# Patient Record
Sex: FEMALE | Race: ASIAN | NOT HISPANIC OR LATINO | URBAN - METROPOLITAN AREA
[De-identification: names, ages, dates, MRNs, and addresses within clinical notes are randomized per-mention and may not be internally consistent; named-entity substitution may affect disease eponyms.]

---

## 2019-02-22 ENCOUNTER — APPOINTMENT (OUTPATIENT)
Dept: GENERAL RADIOLOGY | Age: 49
End: 2019-02-22

## 2019-02-22 ENCOUNTER — OFFICE VISIT (OUTPATIENT)
Dept: ORTHOPEDICS | Age: 49
End: 2019-02-22

## 2019-02-22 DIAGNOSIS — M25.561 RIGHT KNEE PAIN, UNSPECIFIED CHRONICITY: ICD-10-CM

## 2019-02-22 DIAGNOSIS — M17.11 OSTEOARTHRITIS OF RIGHT PATELLOFEMORAL JOINT: ICD-10-CM

## 2019-02-22 DIAGNOSIS — S83.241A OTHER TEAR OF MEDIAL MENISCUS OF RIGHT KNEE AS CURRENT INJURY, INITIAL ENCOUNTER: Primary | ICD-10-CM

## 2019-02-22 PROCEDURE — 99243 OFF/OP CNSLTJ NEW/EST LOW 30: CPT | Performed by: ORTHOPAEDIC SURGERY

## 2019-02-22 ASSESSMENT — ENCOUNTER SYMPTOMS
DIARRHEA: 0
NAUSEA: 0
SHORTNESS OF BREATH: 0
NUMBNESS: 0
SORE THROAT: 0
CONFUSION: 0
FEVER: 0
STRIDOR: 0
AGITATION: 0
COLOR CHANGE: 0
VOMITING: 0
CHILLS: 0
EYE REDNESS: 0
WHEEZING: 0
BRUISES/BLEEDS EASILY: 0
COUGH: 0
WEAKNESS: 0

## 2019-07-01 ENCOUNTER — OFFICE VISIT (OUTPATIENT)
Dept: ORTHOPEDICS | Age: 49
End: 2019-07-01

## 2019-07-01 DIAGNOSIS — M17.11 OSTEOARTHRITIS OF RIGHT PATELLOFEMORAL JOINT: ICD-10-CM

## 2019-07-01 DIAGNOSIS — S83.241A OTHER TEAR OF MEDIAL MENISCUS OF RIGHT KNEE AS CURRENT INJURY, INITIAL ENCOUNTER: Primary | ICD-10-CM

## 2019-07-01 DIAGNOSIS — M17.12 OSTEOARTHRITIS OF LEFT PATELLOFEMORAL JOINT: ICD-10-CM

## 2019-07-01 PROCEDURE — 99214 OFFICE O/P EST MOD 30 MIN: CPT | Performed by: ORTHOPAEDIC SURGERY

## 2019-07-01 ASSESSMENT — ENCOUNTER SYMPTOMS
WEAKNESS: 0
SORE THROAT: 0
SHORTNESS OF BREATH: 0
VOMITING: 0
AGITATION: 0
WHEEZING: 0
DIARRHEA: 0
EYE REDNESS: 0
FEVER: 0
BRUISES/BLEEDS EASILY: 0
STRIDOR: 0
NAUSEA: 0
COLOR CHANGE: 0
CHILLS: 0
CONFUSION: 0
COUGH: 0
NUMBNESS: 0

## 2019-08-06 ENCOUNTER — OFFICE VISIT (OUTPATIENT)
Dept: ORTHOPEDICS | Age: 49
End: 2019-08-06

## 2019-08-06 DIAGNOSIS — S83.241D OTHER TEAR OF MEDIAL MENISCUS OF RIGHT KNEE AS CURRENT INJURY, SUBSEQUENT ENCOUNTER: Primary | ICD-10-CM

## 2019-08-06 DIAGNOSIS — M17.11 OSTEOARTHRITIS OF RIGHT PATELLOFEMORAL JOINT: ICD-10-CM

## 2019-08-06 PROCEDURE — X1094 NO CHARGE VISIT: HCPCS | Performed by: PHYSICIAN ASSISTANT

## 2019-08-06 RX ORDER — ATORVASTATIN CALCIUM 10 MG/1
1 TABLET, FILM COATED ORAL
COMMUNITY
Start: 2017-03-05 | End: 2023-07-06 | Stop reason: ALTCHOICE

## 2019-08-06 RX ORDER — SPIRONOLACTONE 50 MG/1
TABLET, FILM COATED ORAL
COMMUNITY
Start: 2019-07-01

## 2019-08-06 RX ORDER — NORETHINDRONE ACETATE AND ETHINYL ESTRADIOL 1MG-20(21)
1 KIT ORAL
COMMUNITY
Start: 2019-07-20

## 2019-08-06 RX ORDER — PNV NO.95/FERROUS FUM/FOLIC AC 28MG-0.8MG
325 TABLET ORAL
COMMUNITY
End: 2023-07-03 | Stop reason: ALTCHOICE

## 2019-08-06 RX ORDER — DICLOFENAC SODIUM 75 MG/1
75 TABLET, DELAYED RELEASE ORAL 2 TIMES DAILY
Qty: 60 TABLET | Refills: 0 | Status: SHIPPED | OUTPATIENT
Start: 2019-08-06 | End: 2020-08-13 | Stop reason: CLARIF

## 2019-08-06 RX ORDER — HYDROCODONE BITARTRATE AND ACETAMINOPHEN 5; 325 MG/1; MG/1
1 TABLET ORAL EVERY 6 HOURS PRN
Qty: 28 TABLET | Refills: 0 | Status: SHIPPED | OUTPATIENT
Start: 2019-08-06 | End: 2020-03-09 | Stop reason: CLARIF

## 2019-08-06 RX ORDER — DOCUSATE SODIUM 100 MG/1
CAPSULE, LIQUID FILLED ORAL
Qty: 60 CAPSULE | Refills: 0 | Status: SHIPPED | OUTPATIENT
Start: 2019-08-06 | End: 2020-03-09 | Stop reason: CLARIF

## 2019-08-23 ENCOUNTER — DOCUMENTATION (OUTPATIENT)
Dept: ORTHOPEDICS | Age: 49
End: 2019-08-23

## 2019-08-23 ENCOUNTER — EXTERNAL RECORD (OUTPATIENT)
Dept: HEALTH INFORMATION MANAGEMENT | Facility: OTHER | Age: 49
End: 2019-08-23

## 2019-08-23 PROCEDURE — 29880 ARTHRS KNE SRG MNISECTMY M&L: CPT | Performed by: PHYSICIAN ASSISTANT

## 2019-08-23 PROCEDURE — 29880 ARTHRS KNE SRG MNISECTMY M&L: CPT | Performed by: ORTHOPAEDIC SURGERY

## 2019-08-26 ENCOUNTER — OFFICE VISIT (OUTPATIENT)
Dept: ORTHOPEDICS | Age: 49
End: 2019-08-26

## 2019-08-26 DIAGNOSIS — S83.281D OTHER TEAR OF LATERAL MENISCUS OF RIGHT KNEE AS CURRENT INJURY, SUBSEQUENT ENCOUNTER: ICD-10-CM

## 2019-08-26 DIAGNOSIS — S83.241D OTHER TEAR OF MEDIAL MENISCUS OF RIGHT KNEE AS CURRENT INJURY, SUBSEQUENT ENCOUNTER: Primary | ICD-10-CM

## 2019-08-26 PROCEDURE — 99024 POSTOP FOLLOW-UP VISIT: CPT | Performed by: PHYSICIAN ASSISTANT

## 2019-09-17 ENCOUNTER — OFFICE VISIT (OUTPATIENT)
Dept: ORTHOPEDICS | Age: 49
End: 2019-09-17

## 2019-09-17 DIAGNOSIS — S83.281D OTHER TEAR OF LATERAL MENISCUS OF RIGHT KNEE AS CURRENT INJURY, SUBSEQUENT ENCOUNTER: ICD-10-CM

## 2019-09-17 DIAGNOSIS — S83.241D OTHER TEAR OF MEDIAL MENISCUS OF RIGHT KNEE AS CURRENT INJURY, SUBSEQUENT ENCOUNTER: Primary | ICD-10-CM

## 2019-09-17 PROCEDURE — 99024 POSTOP FOLLOW-UP VISIT: CPT | Performed by: ORTHOPAEDIC SURGERY

## 2019-09-17 ASSESSMENT — ENCOUNTER SYMPTOMS
EYE REDNESS: 0
BRUISES/BLEEDS EASILY: 0
FEVER: 0
DIARRHEA: 0
WEAKNESS: 0
CHILLS: 0
COUGH: 0
NAUSEA: 0
CONFUSION: 0
NUMBNESS: 0
WHEEZING: 0
SHORTNESS OF BREATH: 0
STRIDOR: 0
COLOR CHANGE: 0
SORE THROAT: 0
AGITATION: 0
VOMITING: 0

## 2019-10-09 ENCOUNTER — EXTERNAL RECORD (OUTPATIENT)
Dept: HEALTH INFORMATION MANAGEMENT | Facility: OTHER | Age: 49
End: 2019-10-09

## 2019-10-10 ENCOUNTER — OFFICE VISIT (OUTPATIENT)
Dept: ORTHOPEDICS | Age: 49
End: 2019-10-10

## 2019-10-10 DIAGNOSIS — S83.241D OTHER TEAR OF MEDIAL MENISCUS OF RIGHT KNEE AS CURRENT INJURY, SUBSEQUENT ENCOUNTER: Primary | ICD-10-CM

## 2019-10-10 DIAGNOSIS — S83.281D OTHER TEAR OF LATERAL MENISCUS OF RIGHT KNEE AS CURRENT INJURY, SUBSEQUENT ENCOUNTER: ICD-10-CM

## 2019-10-10 PROCEDURE — 99024 POSTOP FOLLOW-UP VISIT: CPT | Performed by: ORTHOPAEDIC SURGERY

## 2019-10-10 ASSESSMENT — ENCOUNTER SYMPTOMS
SORE THROAT: 0
WEAKNESS: 0
EYE REDNESS: 0
VOMITING: 0
STRIDOR: 0
WHEEZING: 0
COLOR CHANGE: 0
CONFUSION: 0
COUGH: 0
NAUSEA: 0
AGITATION: 0
CHILLS: 0
NUMBNESS: 0
FEVER: 0
DIARRHEA: 0
BRUISES/BLEEDS EASILY: 0
SHORTNESS OF BREATH: 0

## 2019-11-29 ENCOUNTER — APPOINTMENT (OUTPATIENT)
Dept: GENERAL RADIOLOGY | Facility: CLINIC | Age: 49
End: 2019-11-29
Attending: EMERGENCY MEDICINE
Payer: COMMERCIAL

## 2019-11-29 ENCOUNTER — HOSPITAL ENCOUNTER (EMERGENCY)
Facility: CLINIC | Age: 49
Discharge: HOME | End: 2019-11-29
Attending: EMERGENCY MEDICINE | Admitting: EMERGENCY MEDICINE
Payer: COMMERCIAL

## 2019-11-29 VITALS
OXYGEN SATURATION: 98 % | SYSTOLIC BLOOD PRESSURE: 118 MMHG | DIASTOLIC BLOOD PRESSURE: 75 MMHG | HEART RATE: 86 BPM | TEMPERATURE: 98.7 F | RESPIRATION RATE: 18 BRPM

## 2019-11-29 DIAGNOSIS — M25.421 ELBOW EFFUSION, RIGHT: ICD-10-CM

## 2019-11-29 DIAGNOSIS — M25.521 RIGHT ELBOW PAIN: ICD-10-CM

## 2019-11-29 PROCEDURE — 99283 EMERGENCY DEPT VISIT LOW MDM: CPT

## 2019-11-29 PROCEDURE — 25000132 ZZH RX MED GY IP 250 OP 250 PS 637: Performed by: EMERGENCY MEDICINE

## 2019-11-29 PROCEDURE — 73080 X-RAY EXAM OF ELBOW: CPT | Mod: RT

## 2019-11-29 PROCEDURE — 25000128 H RX IP 250 OP 636: Performed by: EMERGENCY MEDICINE

## 2019-11-29 RX ORDER — CYCLOBENZAPRINE HCL 10 MG
10 TABLET ORAL ONCE
Status: COMPLETED | OUTPATIENT
Start: 2019-11-29 | End: 2019-11-29

## 2019-11-29 RX ORDER — IBUPROFEN 600 MG/1
600 TABLET, FILM COATED ORAL ONCE
Status: COMPLETED | OUTPATIENT
Start: 2019-11-29 | End: 2019-11-29

## 2019-11-29 RX ORDER — ONDANSETRON 4 MG/1
4 TABLET, ORALLY DISINTEGRATING ORAL ONCE
Status: COMPLETED | OUTPATIENT
Start: 2019-11-29 | End: 2019-11-29

## 2019-11-29 RX ORDER — IBUPROFEN 600 MG/1
600 TABLET, FILM COATED ORAL EVERY 6 HOURS PRN
Qty: 30 TABLET | Refills: 0 | Status: SHIPPED | OUTPATIENT
Start: 2019-11-29

## 2019-11-29 RX ORDER — LIDOCAINE 4 G/G
1 PATCH TOPICAL ONCE
Status: DISCONTINUED | OUTPATIENT
Start: 2019-11-29 | End: 2019-11-29 | Stop reason: HOSPADM

## 2019-11-29 RX ORDER — LIDOCAINE 50 MG/G
1 PATCH TOPICAL EVERY 24 HOURS
Qty: 10 PATCH | Refills: 0 | Status: SHIPPED | OUTPATIENT
Start: 2019-11-29 | End: 2019-12-09

## 2019-11-29 RX ORDER — CYCLOBENZAPRINE HCL 10 MG
10 TABLET ORAL 3 TIMES DAILY PRN
Qty: 15 TABLET | Refills: 0 | Status: SHIPPED | OUTPATIENT
Start: 2019-11-29

## 2019-11-29 RX ADMIN — LIDOCAINE 1 PATCH: 560 PATCH PERCUTANEOUS; TOPICAL; TRANSDERMAL at 03:22

## 2019-11-29 RX ADMIN — CYCLOBENZAPRINE HYDROCHLORIDE 10 MG: 10 TABLET, FILM COATED ORAL at 03:49

## 2019-11-29 RX ADMIN — ONDANSETRON 4 MG: 4 TABLET, ORALLY DISINTEGRATING ORAL at 03:22

## 2019-11-29 RX ADMIN — IBUPROFEN 600 MG: 600 TABLET ORAL at 03:49

## 2019-11-29 ASSESSMENT — ENCOUNTER SYMPTOMS
ARTHRALGIAS: 1
CHILLS: 0
MYALGIAS: 0
FEVER: 0
RHINORRHEA: 0
COUGH: 0

## 2019-11-29 NOTE — ED AVS SNAPSHOT
Emergency Department  [address] [place] [id number]  Phone:  [phone]  Fax:  [phone]                                    Hellen Machado   MRN: [id number]    Department:   Emergency Department   Date of Visit:  11/29/2019           After Visit Summary Signature Page    I have received my discharge instructions, and my questions have been answered. I have discussed any challenges I see with this plan with the nurse or doctor.    ..........................................................................................................................................  Patient/Patient Representative Signature      ..........................................................................................................................................  Patient Representative Print Name and Relationship to Patient    ..................................................               ................................................  Date                                   Time    ..........................................................................................................................................  Reviewed by Signature/Title    ...................................................              ..............................................  Date                                               Time          22EPIC Rev 08/18

## 2019-11-29 NOTE — ED PROVIDER NOTES
History     Chief Complaint:  Arm Pain    HPI   Hellen Machado is a 49 year old otherwise healthy female who presents with friend for evaluation of right am pain, which radiates down to her fingers, that began 3 days ago. The patient did an arm-heavy work out 3 days ago, doing more arm work outs than normal. She has had intermittent pain in her right arm since her work out, which she has been treating with ice and Advil, with last dose 2100 yesterday. This evening the pain became worse and became progressively worse throughout the evening. She also endorses a warm feeling in her arm and states the pain is more in the elbow joint She denies any fever, chills, cough, or cold symptoms.     Allergies:  No Known Drug Allergies      Medications:    The patient is not currently taking any prescribed medications.     Past Medical History:    The patient denies any relevant past medical history.     Past Surgical History:    History reviewed. No pertinent past surgical history.     Family History:    The patient denies any relevant family medical history.     Social History:  The patient was accompanied to the ED by .  Smoking Status: Never  Smokeless Tobacco: Never  Alcohol Use: No  Drug Use: No   Marital Status:       Review of Systems   Constitutional: Negative for chills and fever.   HENT: Negative for congestion and rhinorrhea.    Respiratory: Negative for cough.    Musculoskeletal: Positive for arthralgias. Negative for myalgias.   All other systems reviewed and are negative.        Physical Exam     Patient Vitals for the past 24 hrs:   BP Temp Temp src Pulse Heart Rate Resp SpO2   11/29/19 0446 118/75 -- -- -- 75 -- 98 %   11/29/19 0307 -- 98.7  F (37.1  C) Oral -- -- -- --   11/29/19 0213 129/81 -- -- 86 -- 18 98 %   11/29/19 0212 -- -- Oral -- -- -- --      Physical Exam  Constitutional:  Alert, answers questions appropriately. Appears uncomfortable.  Neck:    Normal range of motion   HEENT:  Pupils round,  equal  Pulmonary/Chest:  Effort normal.   MS:   No tenderness in shoulder, wrist, or hand. Diffusely tender posterior and posterior lateral right elbow. Pain with flexion and extension at elbow. Pronation and supination are normal. No erythema, crepitus, or excess warmth.   Neurological:   No facial asymmetry, gait intact  Psychiatric:   The patient has a normal mood and affect.      Emergency Department Course   Imaging:  Radiology findings were communicated with the patient who voiced understanding of the findings.    Elbow XR, G/E 3 views, right  IMPRESSION: A definite fracture is not visualized. However, there is an elbow effusion present which is often associated with occult fracture. Clinical correlation. Hypertrophic and degenerative change about the right elbow. Remainder unremarkable. If there is strong clinical concern for underlying fracture, CT may be helpful in further evaluation. Otherwise if symptoms persist or progress, short-term follow-up x-rays could be obtained,  Reading per radiology.     Interventions:  0322 Zofran 4 mg PO  0349 Flexeril 10 mg PO  0349 Advil 600 mg PO    Emergency Department Course:  Nursing notes and vitals reviewed.  The patient was sent for a Elbow XR, G/E 3 views, right while in the emergency department, results above.      (0311)   I performed an exam of the patient as documented above. History obtained from patient.    (0437)   I updated patient with results and plan of care.     Findings and plan explained to the Patient. Patient discharged home with instructions regarding supportive care, medications, and reasons to return. The importance of close follow-up was reviewed. Patient prescribed Flexeril, Advil, and Lidoderm. I personally reviewed the imaging results with the Patient and answered all related questions prior to discharge.    Impression & Plan      Medical Decision Making:  Hellen Machado is a 49 year old female who presents complaining of right elbow pain that  started yesterday and worsened overnight. She said she did heavy arm work out in the last couple of days. She also carved the turkey at [facility].     On exam, she has pain with range of motion and is diffusely tender over the posterior and lateral elbow. There is no excess warmth, erythema, crepitous, or skin changes suggestion infection. She has not had fever or systemic symptoms of infection. There was no traumatic injury. Xray of the elbow shows degenerative changes and joint effusion. She does not have a mechanism of injury to suggest an occult radial head fracture. I suspect that her effusion is due to overuse and arthritis. Her pain improved with ibuprofen, flexeril, and a Lidoderm patch. She is given a sling and these medications for home. She will watch for any signs of infection. She will wear the sling for comfort and do gentle range of motion. Her circulation, strength, and sensation are all intact. I don't find tenderness elsewhere. There is no personal or family history of gout. I discussed if her symptoms should persist, she should follow up with her orthopedist in [place]. She is discharged in good condition.     Diagnosis:    ICD-10-CM    1. Right elbow pain M25.521    2. Elbow effusion, right M25.421       Disposition:   Discharge to home.     Discharge Medications:       Medication List      Started    cyclobenzaprine 10 MG tablet  Commonly known as:  FLEXERIL  10 mg, Oral, 3 TIMES DAILY PRN     ibuprofen 600 MG tablet  Commonly known as:  ADVIL/MOTRIN  600 mg, Oral, EVERY 6 HOURS PRN     lidocaine 5 % patch  Commonly known as:  LIDODERM  1 patch, Transdermal, EVERY 24 HOURS            Scribe Disclosure:  Tre SHOEMAKER, am serving as a scribe at 3:05 AM on 11/29/2019 to document services personally performed by Humberto Mcelroy MD, based on my observations and the provider's statements to me.  11/29/2019    EMERGENCY DEPARTMENT       Humberto Mcelroy MD  11/30/19 0700

## 2020-01-01 ENCOUNTER — EXTERNAL RECORD (OUTPATIENT)
Dept: HEALTH INFORMATION MANAGEMENT | Facility: OTHER | Age: 50
End: 2020-01-01

## 2020-01-02 ENCOUNTER — OFFICE VISIT (OUTPATIENT)
Dept: ORTHOPEDICS | Age: 50
End: 2020-01-02

## 2020-01-02 DIAGNOSIS — M17.11 OSTEOARTHRITIS OF RIGHT PATELLOFEMORAL JOINT: Primary | ICD-10-CM

## 2020-01-02 PROCEDURE — 99213 OFFICE O/P EST LOW 20 MIN: CPT | Performed by: ORTHOPAEDIC SURGERY

## 2020-01-02 PROCEDURE — 20610 DRAIN/INJ JOINT/BURSA W/O US: CPT | Performed by: ORTHOPAEDIC SURGERY

## 2020-01-02 RX ORDER — METHYLPREDNISOLONE ACETATE 40 MG/ML
40 INJECTION, SUSPENSION INTRA-ARTICULAR; INTRALESIONAL; INTRAMUSCULAR; SOFT TISSUE
Status: COMPLETED | OUTPATIENT
Start: 2020-01-02 | End: 2020-01-02

## 2020-01-02 RX ORDER — LIDOCAINE HYDROCHLORIDE 10 MG/ML
5 INJECTION, SOLUTION INFILTRATION; PERINEURAL
Status: COMPLETED | OUTPATIENT
Start: 2020-01-02 | End: 2020-01-02

## 2020-01-02 RX ADMIN — METHYLPREDNISOLONE ACETATE 40 MG: 40 INJECTION, SUSPENSION INTRA-ARTICULAR; INTRALESIONAL; INTRAMUSCULAR; SOFT TISSUE at 14:16

## 2020-01-02 RX ADMIN — LIDOCAINE HYDROCHLORIDE 5 ML: 10 INJECTION, SOLUTION INFILTRATION; PERINEURAL at 14:16

## 2020-01-02 ASSESSMENT — ENCOUNTER SYMPTOMS
BRUISES/BLEEDS EASILY: 0
WHEEZING: 0
COUGH: 0
COLOR CHANGE: 0
SORE THROAT: 0
NAUSEA: 0
VOMITING: 0
WEAKNESS: 0
STRIDOR: 0
NUMBNESS: 0
SHORTNESS OF BREATH: 0
DIARRHEA: 0
EYE REDNESS: 0
CONFUSION: 0
AGITATION: 0
CHILLS: 0
FEVER: 0

## 2020-02-17 ENCOUNTER — IMAGING SERVICES (OUTPATIENT)
Dept: GENERAL RADIOLOGY | Age: 50
End: 2020-02-17

## 2020-02-17 ENCOUNTER — OFFICE VISIT (OUTPATIENT)
Dept: ORTHOPEDICS | Age: 50
End: 2020-02-17

## 2020-02-17 DIAGNOSIS — M84.375A STRESS FRACTURE OF METATARSAL BONE OF LEFT FOOT, INITIAL ENCOUNTER: Primary | ICD-10-CM

## 2020-02-17 DIAGNOSIS — M79.672 LEFT FOOT PAIN: ICD-10-CM

## 2020-02-17 PROCEDURE — 99213 OFFICE O/P EST LOW 20 MIN: CPT | Performed by: ORTHOPAEDIC SURGERY

## 2020-02-17 PROCEDURE — 73630 X-RAY EXAM OF FOOT: CPT | Performed by: ORTHOPAEDIC SURGERY

## 2020-02-17 ASSESSMENT — ENCOUNTER SYMPTOMS
SHORTNESS OF BREATH: 0
COLOR CHANGE: 0
WHEEZING: 0
EYE REDNESS: 0
SORE THROAT: 0
NUMBNESS: 0
VOMITING: 0
STRIDOR: 0
NAUSEA: 0
DIARRHEA: 0
AGITATION: 0
FEVER: 0
WEAKNESS: 0
CONFUSION: 0
CHILLS: 0
BRUISES/BLEEDS EASILY: 0
COUGH: 0

## 2020-03-09 ENCOUNTER — OFFICE VISIT (OUTPATIENT)
Dept: ORTHOPEDICS | Age: 50
End: 2020-03-09

## 2020-03-09 ENCOUNTER — IMAGING SERVICES (OUTPATIENT)
Dept: GENERAL RADIOLOGY | Age: 50
End: 2020-03-09

## 2020-03-09 DIAGNOSIS — M84.375A STRESS FRACTURE OF METATARSAL BONE OF LEFT FOOT, INITIAL ENCOUNTER: Primary | ICD-10-CM

## 2020-03-09 PROCEDURE — 99213 OFFICE O/P EST LOW 20 MIN: CPT | Performed by: ORTHOPAEDIC SURGERY

## 2020-03-09 PROCEDURE — 73630 X-RAY EXAM OF FOOT: CPT | Performed by: ORTHOPAEDIC SURGERY

## 2020-03-09 ASSESSMENT — ENCOUNTER SYMPTOMS
SHORTNESS OF BREATH: 0
NUMBNESS: 0
SORE THROAT: 0
COLOR CHANGE: 0
CONFUSION: 0
STRIDOR: 0
FEVER: 0
WEAKNESS: 0
EYE REDNESS: 0
NAUSEA: 0
WHEEZING: 0
CHILLS: 0
COUGH: 0
VOMITING: 0
BRUISES/BLEEDS EASILY: 0
DIARRHEA: 0
AGITATION: 0

## 2020-03-27 ENCOUNTER — TELEPHONE (OUTPATIENT)
Dept: ORTHOPEDICS | Age: 50
End: 2020-03-27

## 2020-03-30 ENCOUNTER — IMAGING SERVICES (OUTPATIENT)
Dept: GENERAL RADIOLOGY | Age: 50
End: 2020-03-30

## 2020-03-30 ENCOUNTER — OFFICE VISIT (OUTPATIENT)
Dept: ORTHOPEDICS | Age: 50
End: 2020-03-30

## 2020-03-30 DIAGNOSIS — M84.375A STRESS FRACTURE OF METATARSAL BONE OF LEFT FOOT, INITIAL ENCOUNTER: Primary | ICD-10-CM

## 2020-03-30 PROCEDURE — 99213 OFFICE O/P EST LOW 20 MIN: CPT | Performed by: ORTHOPAEDIC SURGERY

## 2020-03-30 PROCEDURE — 73630 X-RAY EXAM OF FOOT: CPT | Performed by: ORTHOPAEDIC SURGERY

## 2020-03-30 ASSESSMENT — ENCOUNTER SYMPTOMS
COLOR CHANGE: 0
EYE REDNESS: 0
BRUISES/BLEEDS EASILY: 0
STRIDOR: 0
FEVER: 0
AGITATION: 0
SORE THROAT: 0
NUMBNESS: 0
SHORTNESS OF BREATH: 0
NAUSEA: 0
DIARRHEA: 0
CHILLS: 0
WEAKNESS: 0
CONFUSION: 0
WHEEZING: 0
VOMITING: 0
COUGH: 0

## 2020-06-08 ENCOUNTER — OFFICE VISIT (OUTPATIENT)
Dept: ORTHOPEDICS | Age: 50
End: 2020-06-08

## 2020-06-08 ENCOUNTER — IMAGING SERVICES (OUTPATIENT)
Dept: GENERAL RADIOLOGY | Age: 50
End: 2020-06-08

## 2020-06-08 DIAGNOSIS — M17.12 OSTEOARTHRITIS OF LEFT PATELLOFEMORAL JOINT: Primary | ICD-10-CM

## 2020-06-08 DIAGNOSIS — M25.462 EFFUSION OF LEFT KNEE: ICD-10-CM

## 2020-06-08 DIAGNOSIS — M25.562 LEFT KNEE PAIN, UNSPECIFIED CHRONICITY: ICD-10-CM

## 2020-06-08 PROCEDURE — 99214 OFFICE O/P EST MOD 30 MIN: CPT | Performed by: ORTHOPAEDIC SURGERY

## 2020-06-08 PROCEDURE — 20610 DRAIN/INJ JOINT/BURSA W/O US: CPT | Performed by: ORTHOPAEDIC SURGERY

## 2020-06-08 PROCEDURE — 73562 X-RAY EXAM OF KNEE 3: CPT | Performed by: ORTHOPAEDIC SURGERY

## 2020-06-08 RX ORDER — METHYLPREDNISOLONE ACETATE 40 MG/ML
40 INJECTION, SUSPENSION INTRA-ARTICULAR; INTRALESIONAL; INTRAMUSCULAR; SOFT TISSUE
Status: COMPLETED | OUTPATIENT
Start: 2020-06-08 | End: 2020-06-08

## 2020-06-08 RX ORDER — LIDOCAINE HYDROCHLORIDE 10 MG/ML
5 INJECTION, SOLUTION INFILTRATION; PERINEURAL
Status: COMPLETED | OUTPATIENT
Start: 2020-06-08 | End: 2020-06-08

## 2020-06-08 RX ADMIN — LIDOCAINE HYDROCHLORIDE 5 ML: 10 INJECTION, SOLUTION INFILTRATION; PERINEURAL at 15:22

## 2020-06-08 RX ADMIN — METHYLPREDNISOLONE ACETATE 40 MG: 40 INJECTION, SUSPENSION INTRA-ARTICULAR; INTRALESIONAL; INTRAMUSCULAR; SOFT TISSUE at 15:22

## 2020-06-08 ASSESSMENT — ENCOUNTER SYMPTOMS
EYE REDNESS: 0
SORE THROAT: 0
DIARRHEA: 0
CONFUSION: 0
WHEEZING: 0
NAUSEA: 0
NUMBNESS: 0
COUGH: 0
AGITATION: 0
FEVER: 0
SHORTNESS OF BREATH: 0
STRIDOR: 0
COLOR CHANGE: 0
WEAKNESS: 0
CHILLS: 0
VOMITING: 0
BRUISES/BLEEDS EASILY: 0

## 2020-06-09 ENCOUNTER — IMAGING SERVICES (OUTPATIENT)
Dept: GENERAL RADIOLOGY | Age: 50
End: 2020-06-09
Attending: ORTHOPAEDIC SURGERY

## 2020-06-09 ENCOUNTER — OFFICE VISIT (OUTPATIENT)
Dept: ORTHOPEDICS | Age: 50
End: 2020-06-09

## 2020-06-09 DIAGNOSIS — M19.021 PRIMARY OSTEOARTHRITIS OF RIGHT ELBOW: ICD-10-CM

## 2020-06-09 DIAGNOSIS — R52 PAIN: Primary | ICD-10-CM

## 2020-06-09 PROCEDURE — 99214 OFFICE O/P EST MOD 30 MIN: CPT | Performed by: ORTHOPAEDIC SURGERY

## 2020-06-09 PROCEDURE — 73070 X-RAY EXAM OF ELBOW: CPT | Performed by: ORTHOPAEDIC SURGERY

## 2020-08-13 ENCOUNTER — OFFICE VISIT (OUTPATIENT)
Dept: ORTHOPEDICS | Age: 50
End: 2020-08-13

## 2020-08-13 ENCOUNTER — IMAGING SERVICES (OUTPATIENT)
Dept: GENERAL RADIOLOGY | Age: 50
End: 2020-08-13
Attending: ORTHOPAEDIC SURGERY

## 2020-08-13 DIAGNOSIS — M75.22 BICEPS TENDINITIS ON LEFT: Primary | ICD-10-CM

## 2020-08-13 DIAGNOSIS — M75.42 SUBACROMIAL IMPINGEMENT OF LEFT SHOULDER: ICD-10-CM

## 2020-08-13 DIAGNOSIS — M25.512 LEFT SHOULDER PAIN, UNSPECIFIED CHRONICITY: ICD-10-CM

## 2020-08-13 PROCEDURE — 99213 OFFICE O/P EST LOW 20 MIN: CPT | Performed by: ORTHOPAEDIC SURGERY

## 2020-08-13 PROCEDURE — 73030 X-RAY EXAM OF SHOULDER: CPT | Performed by: ORTHOPAEDIC SURGERY

## 2020-08-13 ASSESSMENT — ENCOUNTER SYMPTOMS
FEVER: 0
NAUSEA: 0
SORE THROAT: 0
NUMBNESS: 0
CONFUSION: 0
COLOR CHANGE: 0
DIARRHEA: 0
SHORTNESS OF BREATH: 0
CHILLS: 0
EYE REDNESS: 0
BRUISES/BLEEDS EASILY: 0
STRIDOR: 0
COUGH: 0
WHEEZING: 0
AGITATION: 0
WEAKNESS: 0
VOMITING: 0

## 2021-01-01 ENCOUNTER — EXTERNAL RECORD (OUTPATIENT)
Dept: HEALTH INFORMATION MANAGEMENT | Facility: OTHER | Age: 51
End: 2021-01-01

## 2021-05-03 ENCOUNTER — IMAGING SERVICES (OUTPATIENT)
Dept: GENERAL RADIOLOGY | Age: 51
End: 2021-05-03
Attending: ORTHOPAEDIC SURGERY

## 2021-05-03 ENCOUNTER — OFFICE VISIT (OUTPATIENT)
Dept: ORTHOPEDICS | Age: 51
End: 2021-05-03

## 2021-05-03 DIAGNOSIS — M25.511 RIGHT SHOULDER PAIN, UNSPECIFIED CHRONICITY: ICD-10-CM

## 2021-05-03 DIAGNOSIS — M75.81 TENDINITIS OF RIGHT ROTATOR CUFF: Primary | ICD-10-CM

## 2021-05-03 PROCEDURE — 73030 X-RAY EXAM OF SHOULDER: CPT | Performed by: ORTHOPAEDIC SURGERY

## 2021-05-03 PROCEDURE — 99214 OFFICE O/P EST MOD 30 MIN: CPT | Performed by: ORTHOPAEDIC SURGERY

## 2021-05-03 PROCEDURE — 20610 DRAIN/INJ JOINT/BURSA W/O US: CPT | Performed by: ORTHOPAEDIC SURGERY

## 2021-05-03 RX ORDER — FOLIC ACID 1 MG/1
TABLET ORAL
COMMUNITY
Start: 2021-05-01

## 2021-05-03 RX ORDER — METHOTREXATE 2.5 MG/1
TABLET ORAL
COMMUNITY
Start: 2021-04-02

## 2021-05-03 RX ADMIN — METHYLPREDNISOLONE ACETATE 40 MG: 40 INJECTION, SUSPENSION INTRA-ARTICULAR; INTRALESIONAL; INTRAMUSCULAR; SOFT TISSUE at 15:57

## 2021-05-03 RX ADMIN — LIDOCAINE HYDROCHLORIDE 5 ML: 10 INJECTION, SOLUTION INFILTRATION; PERINEURAL at 15:57

## 2021-05-03 ASSESSMENT — ENCOUNTER SYMPTOMS
SHORTNESS OF BREATH: 0
DIARRHEA: 0
VOMITING: 0
NUMBNESS: 0
COLOR CHANGE: 0
WHEEZING: 0
COUGH: 0
SORE THROAT: 0
NAUSEA: 0
BRUISES/BLEEDS EASILY: 0
STRIDOR: 0
FEVER: 0
WEAKNESS: 0
CHILLS: 0
EYE REDNESS: 0
CONFUSION: 0
AGITATION: 0

## 2021-05-04 RX ORDER — LIDOCAINE HYDROCHLORIDE 10 MG/ML
5 INJECTION, SOLUTION INFILTRATION; PERINEURAL
Status: COMPLETED | OUTPATIENT
Start: 2021-05-03 | End: 2021-05-03

## 2021-05-04 RX ORDER — METHYLPREDNISOLONE ACETATE 40 MG/ML
40 INJECTION, SUSPENSION INTRA-ARTICULAR; INTRALESIONAL; INTRAMUSCULAR; SOFT TISSUE
Status: COMPLETED | OUTPATIENT
Start: 2021-05-03 | End: 2021-05-03

## 2021-05-24 ENCOUNTER — TELEPHONE (OUTPATIENT)
Dept: ORTHOPEDICS | Age: 51
End: 2021-05-24

## 2021-05-24 DIAGNOSIS — M75.81 TENDINITIS OF RIGHT ROTATOR CUFF: Primary | ICD-10-CM

## 2021-06-04 ENCOUNTER — IMAGING SERVICES (OUTPATIENT)
Dept: OTHER | Age: 51
End: 2021-06-04
Attending: ORTHOPAEDIC SURGERY

## 2021-06-10 ENCOUNTER — OFFICE VISIT (OUTPATIENT)
Dept: ORTHOPEDICS | Age: 51
End: 2021-06-10

## 2021-06-10 DIAGNOSIS — M19.011 OSTEOARTHRITIS OF RIGHT GLENOHUMERAL JOINT: ICD-10-CM

## 2021-06-10 DIAGNOSIS — M19.019 AC JOINT ARTHROPATHY: ICD-10-CM

## 2021-06-10 DIAGNOSIS — M75.121 COMPLETE TEAR OF RIGHT ROTATOR CUFF, UNSPECIFIED WHETHER TRAUMATIC: Primary | ICD-10-CM

## 2021-06-10 PROCEDURE — 99214 OFFICE O/P EST MOD 30 MIN: CPT | Performed by: ORTHOPAEDIC SURGERY

## 2021-06-10 ASSESSMENT — ENCOUNTER SYMPTOMS
NUMBNESS: 0
AGITATION: 0
BRUISES/BLEEDS EASILY: 0
COUGH: 0
CONFUSION: 0
VOMITING: 0
SHORTNESS OF BREATH: 0
WHEEZING: 0
DIARRHEA: 0
NAUSEA: 0
FEVER: 0
EYE REDNESS: 0
CHILLS: 0
COLOR CHANGE: 0
WEAKNESS: 0
STRIDOR: 0
SORE THROAT: 0

## 2021-06-11 ENCOUNTER — E-ADVICE (OUTPATIENT)
Dept: ORTHOPEDICS | Age: 51
End: 2021-06-11

## 2021-09-16 ENCOUNTER — APPOINTMENT (OUTPATIENT)
Dept: ORTHOPEDICS | Age: 51
End: 2021-09-16

## 2021-09-22 ENCOUNTER — E-ADVICE (OUTPATIENT)
Dept: ORTHOPEDICS | Age: 51
End: 2021-09-22

## 2021-10-15 ENCOUNTER — APPOINTMENT (OUTPATIENT)
Dept: ORTHOPEDICS | Age: 51
End: 2021-10-15

## 2021-10-15 ENCOUNTER — IMAGING SERVICES (OUTPATIENT)
Dept: GENERAL RADIOLOGY | Age: 51
End: 2021-10-15
Attending: PHYSICIAN ASSISTANT

## 2021-10-15 ENCOUNTER — OFFICE VISIT (OUTPATIENT)
Dept: ORTHOPEDICS | Age: 51
End: 2021-10-15

## 2021-10-15 DIAGNOSIS — M70.62 TROCHANTERIC BURSITIS OF LEFT HIP: Primary | ICD-10-CM

## 2021-10-15 DIAGNOSIS — M25.559 HIP PAIN: ICD-10-CM

## 2021-10-15 PROCEDURE — 20610 DRAIN/INJ JOINT/BURSA W/O US: CPT | Performed by: ORTHOPAEDIC SURGERY

## 2021-10-15 PROCEDURE — 72170 X-RAY EXAM OF PELVIS: CPT | Performed by: ORTHOPAEDIC SURGERY

## 2021-10-15 PROCEDURE — 99213 OFFICE O/P EST LOW 20 MIN: CPT | Performed by: ORTHOPAEDIC SURGERY

## 2021-10-15 RX ADMIN — METHYLPREDNISOLONE ACETATE 40 MG: 40 INJECTION, SUSPENSION INTRA-ARTICULAR; INTRALESIONAL; INTRAMUSCULAR; SOFT TISSUE at 15:14

## 2021-10-15 RX ADMIN — LIDOCAINE HYDROCHLORIDE 5 ML: 10 INJECTION, SOLUTION INFILTRATION; PERINEURAL at 15:14

## 2021-10-18 RX ORDER — METHYLPREDNISOLONE ACETATE 40 MG/ML
40 INJECTION, SUSPENSION INTRA-ARTICULAR; INTRALESIONAL; INTRAMUSCULAR; SOFT TISSUE
Status: COMPLETED | OUTPATIENT
Start: 2021-10-15 | End: 2021-10-15

## 2021-10-18 RX ORDER — LIDOCAINE HYDROCHLORIDE 10 MG/ML
5 INJECTION, SOLUTION INFILTRATION; PERINEURAL
Status: COMPLETED | OUTPATIENT
Start: 2021-10-15 | End: 2021-10-15

## 2021-10-18 ASSESSMENT — ENCOUNTER SYMPTOMS
CHILLS: 0
NUMBNESS: 0
DIARRHEA: 0
FEVER: 0
COUGH: 0
WEAKNESS: 0
NAUSEA: 0
EYE REDNESS: 0
AGITATION: 0
COLOR CHANGE: 0
VOMITING: 0
SORE THROAT: 0
CONFUSION: 0
WHEEZING: 0
SHORTNESS OF BREATH: 0
STRIDOR: 0
BRUISES/BLEEDS EASILY: 0

## 2022-01-21 ENCOUNTER — APPOINTMENT (OUTPATIENT)
Dept: ORTHOPEDICS | Age: 52
End: 2022-01-21

## 2022-03-22 ENCOUNTER — IMAGING SERVICES (OUTPATIENT)
Dept: GENERAL RADIOLOGY | Age: 52
End: 2022-03-22
Attending: PHYSICIAN ASSISTANT

## 2022-03-22 ENCOUNTER — OFFICE VISIT (OUTPATIENT)
Dept: ORTHOPEDICS | Age: 52
End: 2022-03-22

## 2022-03-22 DIAGNOSIS — M70.62 TROCHANTERIC BURSITIS OF LEFT HIP: Primary | ICD-10-CM

## 2022-03-22 DIAGNOSIS — M43.16 SPONDYLOLISTHESIS OF LUMBAR REGION: ICD-10-CM

## 2022-03-22 PROCEDURE — 72100 X-RAY EXAM L-S SPINE 2/3 VWS: CPT | Performed by: ORTHOPAEDIC SURGERY

## 2022-03-22 PROCEDURE — 99213 OFFICE O/P EST LOW 20 MIN: CPT | Performed by: PHYSICIAN ASSISTANT

## 2022-03-22 RX ORDER — ESCITALOPRAM OXALATE 5 MG/1
5 TABLET ORAL DAILY
COMMUNITY
Start: 2022-03-08 | End: 2023-07-03 | Stop reason: ALTCHOICE

## 2022-03-22 ASSESSMENT — ENCOUNTER SYMPTOMS
NUMBNESS: 0
EYE REDNESS: 0
BRUISES/BLEEDS EASILY: 0
STRIDOR: 0
CHILLS: 0
SORE THROAT: 0
COUGH: 0
SHORTNESS OF BREATH: 0
NAUSEA: 0
VOMITING: 0
CONFUSION: 0
COLOR CHANGE: 0
DIARRHEA: 0
WEAKNESS: 0
WHEEZING: 0
FEVER: 0
AGITATION: 0

## 2023-04-06 ENCOUNTER — IMAGING SERVICES (OUTPATIENT)
Dept: GENERAL RADIOLOGY | Age: 53
End: 2023-04-06
Attending: ORTHOPAEDIC SURGERY

## 2023-04-06 ENCOUNTER — OFFICE VISIT (OUTPATIENT)
Dept: ORTHOPEDICS | Age: 53
End: 2023-04-06

## 2023-04-06 DIAGNOSIS — M25.572 LEFT ANKLE PAIN, UNSPECIFIED CHRONICITY: Primary | ICD-10-CM

## 2023-04-06 DIAGNOSIS — M43.16 SPONDYLOLISTHESIS OF LUMBAR REGION: ICD-10-CM

## 2023-04-06 DIAGNOSIS — M25.561 RIGHT KNEE PAIN, UNSPECIFIED CHRONICITY: ICD-10-CM

## 2023-04-06 DIAGNOSIS — M70.62 TROCHANTERIC BURSITIS OF LEFT HIP: ICD-10-CM

## 2023-04-06 DIAGNOSIS — M17.11 UNILATERAL PRIMARY OSTEOARTHRITIS, RIGHT KNEE: ICD-10-CM

## 2023-04-06 PROCEDURE — 73562 X-RAY EXAM OF KNEE 3: CPT | Performed by: ORTHOPAEDIC SURGERY

## 2023-04-06 PROCEDURE — 73610 X-RAY EXAM OF ANKLE: CPT | Performed by: ORTHOPAEDIC SURGERY

## 2023-04-06 PROCEDURE — 99214 OFFICE O/P EST MOD 30 MIN: CPT | Performed by: ORTHOPAEDIC SURGERY

## 2023-04-10 ASSESSMENT — ENCOUNTER SYMPTOMS
EYE REDNESS: 0
NAUSEA: 0
CONFUSION: 0
SHORTNESS OF BREATH: 0
STRIDOR: 0
WEAKNESS: 0
AGITATION: 0
VOMITING: 0
NUMBNESS: 0
COUGH: 0
COLOR CHANGE: 0
FEVER: 0
DIARRHEA: 0
BRUISES/BLEEDS EASILY: 0
CHILLS: 0
WHEEZING: 0
SORE THROAT: 0

## 2023-07-06 ENCOUNTER — OFFICE VISIT (OUTPATIENT)
Dept: ORTHOPEDICS | Age: 53
End: 2023-07-06

## 2023-07-06 DIAGNOSIS — M17.11 UNILATERAL PRIMARY OSTEOARTHRITIS, RIGHT KNEE: Primary | ICD-10-CM

## 2023-07-06 DIAGNOSIS — M71.21 SYNOVIAL CYST OF RIGHT KNEE: ICD-10-CM

## 2023-07-06 PROCEDURE — 99214 OFFICE O/P EST MOD 30 MIN: CPT | Performed by: ORTHOPAEDIC SURGERY

## 2023-07-06 RX ORDER — ATORVASTATIN CALCIUM 20 MG/1
20 TABLET, FILM COATED ORAL DAILY
COMMUNITY
Start: 2023-05-02

## 2023-07-06 RX ORDER — MINOXIDIL 2.5 MG/1
2.5 TABLET ORAL DAILY
COMMUNITY
Start: 2023-07-01

## 2023-07-06 ASSESSMENT — ENCOUNTER SYMPTOMS
WEAKNESS: 0
SORE THROAT: 0
BRUISES/BLEEDS EASILY: 0
NAUSEA: 0
COLOR CHANGE: 0
COUGH: 0
AGITATION: 0
CHILLS: 0
EYE REDNESS: 0
STRIDOR: 0
WHEEZING: 0
DIARRHEA: 0
FEVER: 0
VOMITING: 0
NUMBNESS: 0
CONFUSION: 0
SHORTNESS OF BREATH: 0

## 2023-10-22 ENCOUNTER — E-ADVICE (OUTPATIENT)
Dept: ORTHOPEDICS | Age: 53
End: 2023-10-22

## 2023-10-25 ENCOUNTER — TELEPHONE (OUTPATIENT)
Dept: ORTHOPEDICS | Age: 53
End: 2023-10-25

## 2023-10-31 ENCOUNTER — OFFICE VISIT (OUTPATIENT)
Dept: ORTHOPEDICS | Age: 53
End: 2023-10-31

## 2023-10-31 DIAGNOSIS — M17.11 UNILATERAL PRIMARY OSTEOARTHRITIS, RIGHT KNEE: Primary | ICD-10-CM

## 2023-10-31 PROCEDURE — 99213 OFFICE O/P EST LOW 20 MIN: CPT | Performed by: ORTHOPAEDIC SURGERY

## 2023-10-31 PROCEDURE — 20610 DRAIN/INJ JOINT/BURSA W/O US: CPT | Performed by: ORTHOPAEDIC SURGERY

## 2023-10-31 RX ADMIN — LIDOCAINE HYDROCHLORIDE 5 ML: 10 INJECTION, SOLUTION INFILTRATION; PERINEURAL at 14:07

## 2023-10-31 RX ADMIN — METHYLPREDNISOLONE ACETATE 40 MG: 40 INJECTION, SUSPENSION INTRA-ARTICULAR; INTRALESIONAL; INTRAMUSCULAR; SOFT TISSUE at 14:07

## 2023-11-02 RX ORDER — METHYLPREDNISOLONE ACETATE 40 MG/ML
40 INJECTION, SUSPENSION INTRA-ARTICULAR; INTRALESIONAL; INTRAMUSCULAR; SOFT TISSUE
Status: COMPLETED | OUTPATIENT
Start: 2023-10-31 | End: 2023-10-31

## 2023-11-02 RX ORDER — LIDOCAINE HYDROCHLORIDE 10 MG/ML
5 INJECTION, SOLUTION INFILTRATION; PERINEURAL
Status: COMPLETED | OUTPATIENT
Start: 2023-10-31 | End: 2023-10-31

## 2024-03-18 ENCOUNTER — TELEPHONE (OUTPATIENT)
Dept: CARDIOLOGY | Age: 54
End: 2024-03-18

## 2024-03-27 ENCOUNTER — HOSPITAL ENCOUNTER (OUTPATIENT)
Dept: ULTRASOUND IMAGING | Age: 54
Discharge: HOME | End: 2024-03-27
Attending: ORTHOPAEDIC SURGERY

## 2024-03-27 DIAGNOSIS — M71.21 SYNOVIAL CYST OF RIGHT KNEE: ICD-10-CM

## 2024-03-27 PROCEDURE — 76882 US LMTD JT/FCL EVL NVASC XTR: CPT

## 2024-04-04 ENCOUNTER — TELEPHONE (OUTPATIENT)
Dept: ORTHOPEDICS | Age: 54
End: 2024-04-04

## 2024-04-04 ENCOUNTER — APPOINTMENT (OUTPATIENT)
Dept: ORTHOPEDICS | Age: 54
End: 2024-04-04

## 2024-04-04 DIAGNOSIS — M17.11 UNILATERAL PRIMARY OSTEOARTHRITIS, RIGHT KNEE: Primary | ICD-10-CM

## 2024-04-04 DIAGNOSIS — M71.21 SYNOVIAL CYST OF RIGHT KNEE: ICD-10-CM

## 2024-04-13 ENCOUNTER — IMAGING SERVICES (OUTPATIENT)
Dept: OTHER | Age: 54
End: 2024-04-13
Attending: ORTHOPAEDIC SURGERY

## 2024-04-30 ENCOUNTER — APPOINTMENT (OUTPATIENT)
Dept: ORTHOPEDICS | Age: 54
End: 2024-04-30

## 2024-04-30 DIAGNOSIS — M17.11 UNILATERAL PRIMARY OSTEOARTHRITIS, RIGHT KNEE: Primary | ICD-10-CM

## 2024-04-30 PROCEDURE — 99213 OFFICE O/P EST LOW 20 MIN: CPT | Performed by: ORTHOPAEDIC SURGERY

## 2024-04-30 RX ORDER — SPIRONOLACTONE 100 MG/1
100 TABLET, FILM COATED ORAL DAILY
COMMUNITY
Start: 2023-12-18

## 2024-09-30 ENCOUNTER — OFFICE VISIT (OUTPATIENT)
Dept: ORTHOPEDICS | Age: 54
End: 2024-09-30

## 2024-09-30 DIAGNOSIS — M17.11 UNILATERAL PRIMARY OSTEOARTHRITIS, RIGHT KNEE: Primary | ICD-10-CM

## 2024-09-30 PROCEDURE — 99213 OFFICE O/P EST LOW 20 MIN: CPT | Performed by: ORTHOPAEDIC SURGERY

## 2024-09-30 PROCEDURE — 20610 DRAIN/INJ JOINT/BURSA W/O US: CPT | Performed by: ORTHOPAEDIC SURGERY

## 2024-09-30 RX ORDER — LIDOCAINE HYDROCHLORIDE 10 MG/ML
5 INJECTION, SOLUTION INFILTRATION; PERINEURAL
Status: COMPLETED | OUTPATIENT
Start: 2024-09-30 | End: 2024-09-30

## 2024-09-30 RX ORDER — METHYLPREDNISOLONE ACETATE 40 MG/ML
40 INJECTION, SUSPENSION INTRA-ARTICULAR; INTRALESIONAL; INTRAMUSCULAR; SOFT TISSUE
Status: COMPLETED | OUTPATIENT
Start: 2024-09-30 | End: 2024-09-30

## 2024-09-30 RX ADMIN — METHYLPREDNISOLONE ACETATE 40 MG: 40 INJECTION, SUSPENSION INTRA-ARTICULAR; INTRALESIONAL; INTRAMUSCULAR; SOFT TISSUE at 13:44

## 2024-09-30 RX ADMIN — LIDOCAINE HYDROCHLORIDE 5 ML: 10 INJECTION, SOLUTION INFILTRATION; PERINEURAL at 13:44

## 2024-11-05 ENCOUNTER — IMAGING SERVICES (OUTPATIENT)
Dept: GENERAL RADIOLOGY | Age: 54
End: 2024-11-05
Attending: ORTHOPAEDIC SURGERY

## 2024-11-05 ENCOUNTER — APPOINTMENT (OUTPATIENT)
Dept: ORTHOPEDICS | Age: 54
End: 2024-11-05

## 2024-11-05 DIAGNOSIS — M54.50 LOW BACK PAIN, UNSPECIFIED BACK PAIN LATERALITY, UNSPECIFIED CHRONICITY, UNSPECIFIED WHETHER SCIATICA PRESENT: Primary | ICD-10-CM

## 2024-11-05 PROCEDURE — 72100 X-RAY EXAM L-S SPINE 2/3 VWS: CPT | Performed by: ORTHOPAEDIC SURGERY

## 2024-11-05 PROCEDURE — 99214 OFFICE O/P EST MOD 30 MIN: CPT | Performed by: ORTHOPAEDIC SURGERY

## 2024-11-05 RX ORDER — CYCLOBENZAPRINE HCL 10 MG
10 TABLET ORAL 3 TIMES DAILY PRN
Qty: 30 TABLET | Refills: 0 | Status: SHIPPED | OUTPATIENT
Start: 2024-11-05

## 2024-11-05 RX ORDER — METHYLPREDNISOLONE 4 MG/1
4 TABLET ORAL SEE ADMIN INSTRUCTIONS
Qty: 21 TABLET | Refills: 0 | Status: SHIPPED | OUTPATIENT
Start: 2024-11-05

## 2024-11-07 ENCOUNTER — E-ADVICE (OUTPATIENT)
Dept: ORTHOPEDICS | Age: 54
End: 2024-11-07

## 2024-12-30 RX ORDER — ATORVASTATIN CALCIUM 40 MG/1
40 TABLET, FILM COATED ORAL DAILY
COMMUNITY
Start: 2024-10-23

## 2024-12-31 ENCOUNTER — OFFICE VISIT (OUTPATIENT)
Dept: ORTHOPEDICS | Age: 54
End: 2024-12-31

## 2024-12-31 DIAGNOSIS — M25.461 EFFUSION OF BOTH KNEE JOINTS: ICD-10-CM

## 2024-12-31 DIAGNOSIS — M25.462 EFFUSION OF BOTH KNEE JOINTS: ICD-10-CM

## 2024-12-31 DIAGNOSIS — M17.0 BILATERAL PRIMARY OSTEOARTHRITIS OF KNEE: Primary | ICD-10-CM

## 2024-12-31 RX ORDER — METHYLPREDNISOLONE 4 MG/1
4 TABLET ORAL SEE ADMIN INSTRUCTIONS
Qty: 21 TABLET | Refills: 0 | Status: SHIPPED | OUTPATIENT
Start: 2024-12-31

## 2024-12-31 RX ORDER — LIDOCAINE HYDROCHLORIDE 10 MG/ML
3 INJECTION, SOLUTION INFILTRATION; PERINEURAL
Status: COMPLETED | OUTPATIENT
Start: 2024-12-31 | End: 2024-12-31

## 2024-12-31 RX ADMIN — LIDOCAINE HYDROCHLORIDE 3 ML: 10 INJECTION, SOLUTION INFILTRATION; PERINEURAL at 09:46

## 2025-01-16 ENCOUNTER — APPOINTMENT (OUTPATIENT)
Dept: ORTHOPEDICS | Age: 55
End: 2025-01-16

## 2025-01-16 DIAGNOSIS — M25.461 EFFUSION OF BOTH KNEE JOINTS: ICD-10-CM

## 2025-01-16 DIAGNOSIS — M25.462 EFFUSION OF BOTH KNEE JOINTS: ICD-10-CM

## 2025-01-16 DIAGNOSIS — M54.16 LUMBAR RADICULOPATHY: Primary | ICD-10-CM

## 2025-01-16 DIAGNOSIS — M17.0 BILATERAL PRIMARY OSTEOARTHRITIS OF KNEE: ICD-10-CM

## 2025-01-16 PROCEDURE — 99214 OFFICE O/P EST MOD 30 MIN: CPT | Performed by: ORTHOPAEDIC SURGERY
